# Patient Record
Sex: FEMALE | Race: ASIAN | URBAN - METROPOLITAN AREA
[De-identification: names, ages, dates, MRNs, and addresses within clinical notes are randomized per-mention and may not be internally consistent; named-entity substitution may affect disease eponyms.]

---

## 2020-06-22 ENCOUNTER — TELEPHONE ENCOUNTER (OUTPATIENT)
Dept: URBAN - METROPOLITAN AREA CLINIC 23 | Facility: CLINIC | Age: 40
End: 2020-06-22

## 2020-06-23 ENCOUNTER — TELEPHONE ENCOUNTER (OUTPATIENT)
Dept: URBAN - METROPOLITAN AREA CLINIC 23 | Facility: CLINIC | Age: 40
End: 2020-06-23

## 2020-06-25 ENCOUNTER — OFFICE VISIT (OUTPATIENT)
Dept: URBAN - METROPOLITAN AREA CLINIC 23 | Facility: CLINIC | Age: 40
End: 2020-06-25
Payer: COMMERCIAL

## 2020-06-25 DIAGNOSIS — R10.9 ABDOMINAL PAIN: ICD-10-CM

## 2020-06-25 DIAGNOSIS — K51.90 ULCERATIVE COLITIS: ICD-10-CM

## 2020-06-25 DIAGNOSIS — Z91.19 NONCOMPLIANCE: ICD-10-CM

## 2020-06-25 PROCEDURE — 99213 OFFICE O/P EST LOW 20 MIN: CPT | Performed by: INTERNAL MEDICINE

## 2020-06-25 RX ORDER — MESALAMINE 4 G/60ML
INSERT 60 MILLILITERS (4 GRAM) BY RECTAL ROUTE ONCE DAILY AT BEDTIME ENEMA RECTAL 1
Qty: 30 | Refills: 6 | Status: ACTIVE | COMMUNITY
Start: 2019-07-02

## 2020-06-25 RX ORDER — MESALAMINE 1.2 G/1
TAKE 4 TABLETS (4.8 GRAM) BY ORAL ROUTE ONCE DAILY WITH A MEAL TABLET, DELAYED RELEASE ORAL 1
Qty: 120 | Refills: 6 | Status: ACTIVE | COMMUNITY
Start: 2019-04-30

## 2020-06-25 RX ORDER — MESALAMINE 1.2 G/1
TAKE 4 TABLETS (4.8 GRAM) BY ORAL ROUTE ONCE DAILY WITH A MEAL TABLET, DELAYED RELEASE ORAL 1
Qty: 360 | Refills: 1 | Status: ACTIVE | COMMUNITY
Start: 2019-06-14

## 2020-06-25 RX ORDER — BUDESONIDE 9 MG/1
TAKE 1 TABLET (9 MG) BY ORAL ROUTE ONCE DAILY IN THE MORNING SWALLOWING WHOLE WITH WATER. DO NOT BREAK, CRUSH, DISSOLVE AND/OR CHEW TABLET, EXTENDED RELEASE ORAL 1
Qty: 56 | Refills: 0 | Status: ACTIVE | COMMUNITY
Start: 2019-06-13

## 2020-06-25 RX ORDER — PREDNISONE 10 MG/1
TAKE 4 TABLETS DAILY FOR 5 DAYS, THEN 3 TABS FOR 5 DAYS, 2 TABS FOR 5 DAYS, 1 TAB FOR 5 DAYS THEN STOP TABLET ORAL
Qty: 60 | Refills: 1 | Status: ACTIVE | COMMUNITY
Start: 2019-07-02

## 2020-06-25 NOTE — HPI-TODAY'S VISIT:
She describes that the symptoms started again about 6 months ago the bleeding started in small amounts, had felt it stopped at some point.  her stools had been loose had stopped taking her mesalamine pills and enema she has been having 3-7 bowel movements.  sometimes she has nighttime bowel movements. she states that her abdominal pain is all over the abdomen, comes and goes with bowel movements and with eating and before.   her appetite has been poor. last 3 days she ate normally having vomiting at times keeping liquid diet weight - she has lost about 30 pounds in the 6 months as well complains about joint pains, rash no fevers, chills has used tylenol only  she is having leg pain

## 2020-06-25 NOTE — PHYSICAL EXAM HENT:
Head, limited exam due to mask Face,  Face within normal limits,  Ears,  External ears within normal limits,

## 2020-06-25 NOTE — PHYSICAL EXAM CHEST:
no lesions,  no deformities,  no traumatic injuries,  no significant scars are present,  chest wall non-tender, breathing is unlabored without accessory muscle use,normal breath sounds

## 2020-06-25 NOTE — HPI-OTHER HISTORIES
The patient is a 39-year-old female with a history of ulcerative colitis originally diagnosed in 2013 when she presented degree the hospital with bloody diarrhea. Since that time she has been on mesalamine orally with different episodes of flares throughout the years requiring steroid treatment. She was last seen in July of 2019 and at that time was clinically doing well. She had had her last colonoscopy in June 2019 and was found have active inflammation in the distal colon with biopsies showing active colitis in the left colon. No dysplasia on biopsies throughout the colon. She was started on Uceris at that time and had improvement. She had been started on mesalamine enema following the colonoscopy and had been encouraged her July visit to continue the enema. It had been recommended that she repeat the colonoscopy in 6 months.

## 2020-06-26 ENCOUNTER — OUT OF OFFICE VISIT (OUTPATIENT)
Dept: URBAN - METROPOLITAN AREA MEDICAL CENTER 27 | Facility: MEDICAL CENTER | Age: 40
End: 2020-06-26
Payer: COMMERCIAL

## 2020-06-26 DIAGNOSIS — E46 MALNUTRITION: ICD-10-CM

## 2020-06-26 DIAGNOSIS — K51.50 LEFT SIDED COLITIS: ICD-10-CM

## 2020-06-26 DIAGNOSIS — K29.30 CHRONIC SUPERFICIAL GASTRITIS: ICD-10-CM

## 2020-06-26 DIAGNOSIS — R63.4 ABNORMAL INTENTIONAL WEIGHT LOSS: ICD-10-CM

## 2020-06-26 DIAGNOSIS — R19.7 ACUTE DIARRHEA: ICD-10-CM

## 2020-06-26 DIAGNOSIS — K51.818 OTHER ULCERATIVE COLITIS WITH OTHER COMPLICATION: ICD-10-CM

## 2020-06-26 DIAGNOSIS — K51.80 OTHER ULCERATIVE COLITIS WITHOUT COMPLICATION: ICD-10-CM

## 2020-06-26 DIAGNOSIS — K22.8 COLUMNAR-LINED ESOPHAGUS: ICD-10-CM

## 2020-06-26 DIAGNOSIS — D50.8 IRON DEFICIENCY ANEMIA DUE TO DIETARY CAUSES: ICD-10-CM

## 2020-06-26 PROCEDURE — 43239 EGD BIOPSY SINGLE/MULTIPLE: CPT | Performed by: INTERNAL MEDICINE

## 2020-06-26 PROCEDURE — 45380 COLONOSCOPY AND BIOPSY: CPT | Performed by: INTERNAL MEDICINE

## 2020-06-26 PROCEDURE — 99232 SBSQ HOSP IP/OBS MODERATE 35: CPT | Performed by: INTERNAL MEDICINE

## 2020-06-26 PROCEDURE — G9937 DIG OR SURV COLSCO: HCPCS | Performed by: INTERNAL MEDICINE

## 2020-06-26 PROCEDURE — 99255 IP/OBS CONSLTJ NEW/EST HI 80: CPT | Performed by: INTERNAL MEDICINE

## 2020-06-30 ENCOUNTER — TELEPHONE ENCOUNTER (OUTPATIENT)
Dept: URBAN - METROPOLITAN AREA CLINIC 23 | Facility: CLINIC | Age: 40
End: 2020-06-30

## 2020-06-30 ENCOUNTER — TELEPHONE ENCOUNTER (OUTPATIENT)
Dept: URBAN - METROPOLITAN AREA CLINIC 92 | Facility: CLINIC | Age: 40
End: 2020-06-30

## 2020-06-30 RX ORDER — ACETAMINOPHEN 650 MG
2 TABLETS AS NEEDED TABLET, EXTENDED RELEASE ORAL
Qty: 10 | Refills: 0 | Status: ACTIVE | COMMUNITY
Start: 2020-06-30 | End: 2020-07-01

## 2020-06-30 RX ORDER — MESALAMINE 1.2 G/1
TAKE 4 TABLETS (4.8 GRAM) BY ORAL ROUTE ONCE DAILY WITH A MEAL TABLET, DELAYED RELEASE ORAL 1
Qty: 120 | Refills: 6 | Status: ACTIVE | COMMUNITY
Start: 2019-04-30

## 2020-06-30 RX ORDER — FERROUS FUMARATE AND POLYSACCHRIDE IRON VITAMIN MINERAL COMPLEX SUPPLEMENT 191.2; 135.9; 1; 210; 20; 5; 5; 7; 25; 3 MG/1; MG/1; MG/1; MG/1; MG/1; MG/1; MG/1; MG/1; MG/1; UG/1
1 CAPSULE CAPSULE ORAL ONCE A DAY
Qty: 90 | Refills: 1 | OUTPATIENT
Start: 2020-06-30

## 2020-06-30 RX ORDER — INFLIXIMAB 100 MG/10ML
AS DIRECTED INJECTION, POWDER, LYOPHILIZED, FOR SOLUTION INTRAVENOUS
Qty: 1 | Refills: 0 | OUTPATIENT
Start: 2020-06-30 | End: 2020-07-01

## 2020-06-30 RX ORDER — MESALAMINE 1.2 G/1
TAKE 4 TABLETS (4.8 GRAM) BY ORAL ROUTE ONCE DAILY WITH A MEAL TABLET, DELAYED RELEASE ORAL 1
Qty: 360 | Refills: 1 | Status: ACTIVE | COMMUNITY
Start: 2019-06-14

## 2020-06-30 RX ORDER — ACETAMINOPHEN 650 MG
2 TABLETS AS NEEDED TABLET, EXTENDED RELEASE ORAL
Qty: 10 | Refills: 0 | OUTPATIENT
Start: 2020-06-30 | End: 2020-07-01

## 2020-06-30 RX ORDER — INFLIXIMAB 100 MG/10ML
AS DIRECTED INJECTION, POWDER, LYOPHILIZED, FOR SOLUTION INTRAVENOUS
Qty: 1 | Refills: 0 | Status: ACTIVE | COMMUNITY
Start: 2020-06-30 | End: 2020-07-01

## 2020-06-30 RX ORDER — MESALAMINE 4 G/60ML
INSERT 60 MILLILITERS (4 GRAM) BY RECTAL ROUTE ONCE DAILY AT BEDTIME ENEMA RECTAL 1
Qty: 30 | Refills: 6 | Status: ACTIVE | COMMUNITY
Start: 2019-07-02

## 2020-06-30 RX ORDER — BUDESONIDE 9 MG/1
TAKE 1 TABLET (9 MG) BY ORAL ROUTE ONCE DAILY IN THE MORNING SWALLOWING WHOLE WITH WATER. DO NOT BREAK, CRUSH, DISSOLVE AND/OR CHEW TABLET, EXTENDED RELEASE ORAL 1
Qty: 56 | Refills: 0 | Status: ACTIVE | COMMUNITY
Start: 2019-06-13

## 2020-06-30 RX ORDER — PREDNISONE 10 MG/1
TAKE 4 TABLETS DAILY FOR 5 DAYS, THEN 3 TABS FOR 5 DAYS, 2 TABS FOR 5 DAYS, 1 TAB FOR 5 DAYS THEN STOP TABLET ORAL
Qty: 60 | Refills: 1 | Status: ACTIVE | COMMUNITY
Start: 2019-07-02

## 2020-07-02 ENCOUNTER — OFFICE VISIT (OUTPATIENT)
Dept: URBAN - METROPOLITAN AREA CLINIC 23 | Facility: CLINIC | Age: 40
End: 2020-07-02

## 2020-07-02 ENCOUNTER — TELEPHONE ENCOUNTER (OUTPATIENT)
Dept: URBAN - METROPOLITAN AREA CLINIC 23 | Facility: CLINIC | Age: 40
End: 2020-07-02

## 2020-07-02 ENCOUNTER — OFFICE VISIT (OUTPATIENT)
Dept: URBAN - METROPOLITAN AREA CLINIC 92 | Facility: CLINIC | Age: 40
End: 2020-07-02
Payer: COMMERCIAL

## 2020-07-02 DIAGNOSIS — Z91.19 NONCOMPLIANCE: ICD-10-CM

## 2020-07-02 DIAGNOSIS — K62.5 RECTAL BLEEDING: ICD-10-CM

## 2020-07-02 DIAGNOSIS — R60.0 LOWER EXTREMITY EDEMA: ICD-10-CM

## 2020-07-02 DIAGNOSIS — K51.90 ULCERATIVE COLITIS: ICD-10-CM

## 2020-07-02 DIAGNOSIS — R12 HEARTBURN: ICD-10-CM

## 2020-07-02 PROCEDURE — 99443 PHONE E/M BY PHYS 21-30 MIN: CPT | Performed by: INTERNAL MEDICINE

## 2020-07-02 RX ORDER — BUDESONIDE 9 MG/1
TAKE 1 TABLET (9 MG) BY ORAL ROUTE ONCE DAILY IN THE MORNING SWALLOWING WHOLE WITH WATER. DO NOT BREAK, CRUSH, DISSOLVE AND/OR CHEW TABLET, EXTENDED RELEASE ORAL 1
Qty: 56 | Refills: 0 | Status: ACTIVE | COMMUNITY
Start: 2019-06-13

## 2020-07-02 RX ORDER — MESALAMINE 1.2 G/1
TAKE 4 TABLETS (4.8 GRAM) BY ORAL ROUTE ONCE DAILY WITH A MEAL TABLET, DELAYED RELEASE ORAL 1
Qty: 120 | Refills: 6 | Status: ACTIVE | COMMUNITY
Start: 2019-04-30

## 2020-07-02 RX ORDER — MESALAMINE 1.2 G/1
TAKE 4 TABLETS (4.8 GRAM) BY ORAL ROUTE ONCE DAILY WITH A MEAL TABLET, DELAYED RELEASE ORAL 1
Qty: 360 | Refills: 1 | Status: ACTIVE | COMMUNITY
Start: 2019-06-14

## 2020-07-02 RX ORDER — MESALAMINE 4 G/60ML
INSERT 60 MILLILITERS (4 GRAM) BY RECTAL ROUTE ONCE DAILY AT BEDTIME ENEMA RECTAL 1
Qty: 30 | Refills: 6 | Status: ACTIVE | COMMUNITY
Start: 2019-07-02

## 2020-07-02 RX ORDER — FERROUS FUMARATE AND POLYSACCHRIDE IRON VITAMIN MINERAL COMPLEX SUPPLEMENT 191.2; 135.9; 1; 210; 20; 5; 5; 7; 25; 3 MG/1; MG/1; MG/1; MG/1; MG/1; MG/1; MG/1; MG/1; MG/1; UG/1
1 CAPSULE CAPSULE ORAL ONCE A DAY
Qty: 90 | Refills: 1 | Status: ACTIVE | COMMUNITY
Start: 2020-06-30

## 2020-07-02 RX ORDER — PREDNISONE 10 MG/1
TAKE 4 TABLETS DAILY FOR 5 DAYS, THEN 3 TABS FOR 5 DAYS, 2 TABS FOR 5 DAYS, 1 TAB FOR 5 DAYS THEN STOP TABLET ORAL
Qty: 60 | Refills: 1 | Status: ACTIVE | COMMUNITY
Start: 2019-07-02

## 2020-07-02 NOTE — HPI-OTHER HISTORIES
complains of lower extremity swelling , both legs not one leg at this point.  no leg pain, worse when she walks . somewhat better when she puts her legs up she is feeling weak her appetite has been good, she has been on more of a liquid diet, chicken stock/beef stock.   she is considering taking s break from work to focus on her health she did get the prescription for the iron  but is working [place] the [place]

## 2020-07-06 ENCOUNTER — TELEPHONE ENCOUNTER (OUTPATIENT)
Dept: URBAN - METROPOLITAN AREA CLINIC 23 | Facility: CLINIC | Age: 40
End: 2020-07-06

## 2020-07-07 ENCOUNTER — TELEPHONE ENCOUNTER (OUTPATIENT)
Dept: URBAN - METROPOLITAN AREA CLINIC 23 | Facility: CLINIC | Age: 40
End: 2020-07-07

## 2020-07-09 ENCOUNTER — OFFICE VISIT (OUTPATIENT)
Dept: URBAN - METROPOLITAN AREA CLINIC 23 | Facility: CLINIC | Age: 40
End: 2020-07-09
Payer: COMMERCIAL

## 2020-07-09 ENCOUNTER — DASHBOARD ENCOUNTERS (OUTPATIENT)
Age: 40
End: 2020-07-09

## 2020-07-09 DIAGNOSIS — K51.90 ULCERATIVE COLITIS: ICD-10-CM

## 2020-07-09 DIAGNOSIS — K62.5 RECTAL BLEEDING: ICD-10-CM

## 2020-07-09 DIAGNOSIS — R63.6 UNDERWEIGHT: ICD-10-CM

## 2020-07-09 DIAGNOSIS — R60.0 LOWER EXTREMITY EDEMA: ICD-10-CM

## 2020-07-09 DIAGNOSIS — Z91.19 NONCOMPLIANCE: ICD-10-CM

## 2020-07-09 DIAGNOSIS — R12 HEARTBURN: ICD-10-CM

## 2020-07-09 PROCEDURE — G8427 DOCREV CUR MEDS BY ELIG CLIN: HCPCS | Performed by: INTERNAL MEDICINE

## 2020-07-09 PROCEDURE — 99214 OFFICE O/P EST MOD 30 MIN: CPT | Performed by: INTERNAL MEDICINE

## 2020-07-09 PROCEDURE — G8420 CALC BMI NORM PARAMETERS: HCPCS | Performed by: INTERNAL MEDICINE

## 2020-07-09 PROCEDURE — G9903 PT SCRN TBCO ID AS NON USER: HCPCS | Performed by: INTERNAL MEDICINE

## 2020-07-09 RX ORDER — BUDESONIDE 9 MG/1
TAKE 1 TABLET (9 MG) BY ORAL ROUTE ONCE DAILY IN THE MORNING SWALLOWING WHOLE WITH WATER. DO NOT BREAK, CRUSH, DISSOLVE AND/OR CHEW TABLET, EXTENDED RELEASE ORAL 1
Qty: 56 | Refills: 0 | Status: ACTIVE | COMMUNITY
Start: 2019-06-13

## 2020-07-09 RX ORDER — MESALAMINE 1.2 G/1
TAKE 4 TABLETS (4.8 GRAM) BY ORAL ROUTE ONCE DAILY WITH A MEAL TABLET, DELAYED RELEASE ORAL 1
Qty: 120 | Refills: 6 | Status: ACTIVE | COMMUNITY
Start: 2019-04-30

## 2020-07-09 RX ORDER — PREDNISONE 10 MG/1
TAKE 4 TABLETS DAILY FOR 5 DAYS, THEN 3 TABS FOR 5 DAYS, 2 TABS FOR 5 DAYS, 1 TAB FOR 5 DAYS THEN STOP TABLET ORAL
Qty: 60 | Refills: 1 | Status: ACTIVE | COMMUNITY
Start: 2019-07-02

## 2020-07-09 RX ORDER — MESALAMINE 1.2 G/1
TAKE 4 TABLETS (4.8 GRAM) BY ORAL ROUTE ONCE DAILY WITH A MEAL TABLET, DELAYED RELEASE ORAL 1
Qty: 360 | Refills: 1 | Status: ACTIVE | COMMUNITY
Start: 2019-06-14

## 2020-07-09 RX ORDER — FERROUS FUMARATE AND POLYSACCHRIDE IRON VITAMIN MINERAL COMPLEX SUPPLEMENT 191.2; 135.9; 1; 210; 20; 5; 5; 7; 25; 3 MG/1; MG/1; MG/1; MG/1; MG/1; MG/1; MG/1; MG/1; MG/1; UG/1
1 CAPSULE CAPSULE ORAL ONCE A DAY
Qty: 90 | Refills: 1 | Status: ACTIVE | COMMUNITY
Start: 2020-06-30

## 2020-07-09 RX ORDER — MESALAMINE 4 G/60ML
INSERT 60 MILLILITERS (4 GRAM) BY RECTAL ROUTE ONCE DAILY AT BEDTIME ENEMA RECTAL 1
Qty: 30 | Refills: 6 | Status: ACTIVE | COMMUNITY
Start: 2019-07-02

## 2020-07-09 NOTE — PHYSICAL EXAM CONSTITUTIONAL:
cachectic, comfortable appearing , in no acute distress , ambulating without difficulty , normal communication ability

## 2020-07-09 NOTE — HPI-TODAY'S VISIT:
she feels that there is no swelling now- she feels that the leg swelling  improved abruptly   on  prednisone 30 mg daily her bowel movements are more formed at this point no abdominal pain at all  weight is 82 appetite is good, eating soft . she is able to tolerate the food well she states that she wishes she could eat more but she is trying to do smaller meals thoughout day , she is not using  any boost or ensure no blood in the stool she is doing the iron on a daily basis at this point

## 2020-07-20 ENCOUNTER — OFFICE VISIT (OUTPATIENT)
Dept: URBAN - METROPOLITAN AREA CLINIC 97 | Facility: CLINIC | Age: 40
End: 2020-07-20

## 2020-07-21 ENCOUNTER — TELEPHONE ENCOUNTER (OUTPATIENT)
Dept: URBAN - METROPOLITAN AREA CLINIC 23 | Facility: CLINIC | Age: 40
End: 2020-07-21

## 2020-07-21 PROBLEM — 267038008 EDEMA: Status: ACTIVE | Noted: 2020-07-02

## 2020-07-21 PROBLEM — 7058009 NONCOMPLIANCE WITH TREATMENT: Status: ACTIVE | Noted: 2020-06-30

## 2020-08-03 ENCOUNTER — OFFICE VISIT (OUTPATIENT)
Dept: URBAN - METROPOLITAN AREA CLINIC 97 | Facility: CLINIC | Age: 40
End: 2020-08-03

## 2020-08-31 ENCOUNTER — OFFICE VISIT (OUTPATIENT)
Dept: URBAN - METROPOLITAN AREA CLINIC 97 | Facility: CLINIC | Age: 40
End: 2020-08-31

## 2023-06-02 NOTE — PHYSICAL EXAM CONSTITUTIONAL:
Antibiotics given-   Antibiotics (72h ago, onward)    Start     Stop Route Frequency Ordered    06/02/23 0930  silver sulfADIAZINE 1% cream         -- Top Daily 06/02/23 0815    06/02/23 0930  mupirocin 2 % ointment         06/07 0859 Nasl 2 times daily 06/02/23 0816    06/02/23 0500  piperacillin-tazobactam (ZOSYN) 4.5 g in dextrose 5 % in water (D5W) 5 % 100 mL IVPB (MB+)         -- IV Every 12 hours (non-standard times) 06/02/23 0048    06/02/23 0207  vancomycin - pharmacy to dose         -- IV pharmacy to manage frequency 06/02/23 0107        Latest lactate reviewed-  Recent Labs   Lab 06/02/23  0114 06/02/23  0342 06/02/23  0840   LACTATE 8.7* 4.6* 2.3*     Organ dysfunction indicated by Acute kidney injury       well developed, well nourished , in no acute distress , ambulating without difficulty , normal communication ability

## 2024-08-10 ENCOUNTER — CLAIMS CREATED FROM THE CLAIM WINDOW (OUTPATIENT)
Dept: URBAN - METROPOLITAN AREA MEDICAL CENTER 10 | Facility: MEDICAL CENTER | Age: 44
End: 2024-08-10
Payer: COMMERCIAL

## 2024-08-10 DIAGNOSIS — K51.80 OTHER ULCERATIVE COLITIS: ICD-10-CM

## 2024-08-10 DIAGNOSIS — D64.89 NORMOCYTIC ANEMIA: ICD-10-CM

## 2024-08-10 PROCEDURE — 99223 1ST HOSP IP/OBS HIGH 75: CPT | Performed by: INTERNAL MEDICINE

## 2024-08-10 PROCEDURE — G8427 DOCREV CUR MEDS BY ELIG CLIN: HCPCS | Performed by: INTERNAL MEDICINE

## 2024-08-10 PROCEDURE — 99255 IP/OBS CONSLTJ NEW/EST HI 80: CPT | Performed by: INTERNAL MEDICINE

## 2024-08-11 ENCOUNTER — CLAIMS CREATED FROM THE CLAIM WINDOW (OUTPATIENT)
Dept: URBAN - METROPOLITAN AREA MEDICAL CENTER 10 | Facility: MEDICAL CENTER | Age: 44
End: 2024-08-11
Payer: COMMERCIAL

## 2024-08-11 DIAGNOSIS — K51.80 OTHER ULCERATIVE COLITIS: ICD-10-CM

## 2024-08-11 DIAGNOSIS — D64.89 NORMOCYTIC ANEMIA: ICD-10-CM

## 2024-08-11 PROCEDURE — 99232 SBSQ HOSP IP/OBS MODERATE 35: CPT | Performed by: INTERNAL MEDICINE

## 2024-08-12 ENCOUNTER — CLAIMS CREATED FROM THE CLAIM WINDOW (OUTPATIENT)
Dept: URBAN - METROPOLITAN AREA MEDICAL CENTER 10 | Facility: MEDICAL CENTER | Age: 44
End: 2024-08-12
Payer: COMMERCIAL

## 2024-08-12 DIAGNOSIS — K51.80 OTHER ULCERATIVE COLITIS: ICD-10-CM

## 2024-08-12 DIAGNOSIS — D64.89 NORMOCYTIC ANEMIA: ICD-10-CM

## 2024-08-12 PROCEDURE — 99232 SBSQ HOSP IP/OBS MODERATE 35: CPT | Performed by: INTERNAL MEDICINE
